# Patient Record
Sex: MALE | Race: WHITE
[De-identification: names, ages, dates, MRNs, and addresses within clinical notes are randomized per-mention and may not be internally consistent; named-entity substitution may affect disease eponyms.]

---

## 2019-10-20 ENCOUNTER — HOSPITAL ENCOUNTER (EMERGENCY)
Dept: HOSPITAL 33 - ED | Age: 79
Discharge: ANOTHER HOSPITAL | End: 2019-10-20
Payer: MEDICARE

## 2019-10-20 VITALS — OXYGEN SATURATION: 92 % | DIASTOLIC BLOOD PRESSURE: 85 MMHG | SYSTOLIC BLOOD PRESSURE: 145 MMHG | HEART RATE: 114 BPM

## 2019-10-20 DIAGNOSIS — J81.0: Primary | ICD-10-CM

## 2019-10-20 DIAGNOSIS — R03.0: ICD-10-CM

## 2019-10-20 DIAGNOSIS — Z79.899: ICD-10-CM

## 2019-10-20 DIAGNOSIS — I25.10: ICD-10-CM

## 2019-10-20 DIAGNOSIS — Z85.46: ICD-10-CM

## 2019-10-20 DIAGNOSIS — K62.5: ICD-10-CM

## 2019-10-20 DIAGNOSIS — E11.9: ICD-10-CM

## 2019-10-20 DIAGNOSIS — E78.00: ICD-10-CM

## 2019-10-20 DIAGNOSIS — N28.9: ICD-10-CM

## 2019-10-20 DIAGNOSIS — K64.5: ICD-10-CM

## 2019-10-20 LAB
ALBUMIN SERPL-MCNC: 3.5 G/DL (ref 3.5–5)
ALP SERPL-CCNC: 88 U/L (ref 38–126)
ALT SERPL-CCNC: 17 U/L (ref 0–50)
ANION GAP SERPL CALC-SCNC: 15.2 MEQ/L (ref 5–15)
APTT PPP: 30.6 SECONDS (ref 24.1–36.1)
AST SERPL QL: 19 U/L (ref 17–59)
BASOPHILS # BLD AUTO: 0.04 10*3/UL (ref 0–0.4)
BASOPHILS NFR BLD AUTO: 0.4 % (ref 0–0.4)
BILIRUB BLD-MCNC: 0.4 MG/DL (ref 0.2–1.3)
BUN SERPL-MCNC: 36 MG/DL (ref 9–20)
CALCIUM SPEC-MCNC: 9.2 MG/DL (ref 8.4–10.2)
CHLORIDE SERPL-SCNC: 109 MMOL/L (ref 98–107)
CO2 SERPL-SCNC: 25 MMOL/L (ref 22–30)
CREAT SERPL-MCNC: 3.16 MG/DL (ref 0.66–1.25)
EOSINOPHIL # BLD AUTO: 0.78 10*3/UL (ref 0–0.5)
GLUCOSE SERPL-MCNC: 102 MG/DL (ref 74–106)
GLUCOSE UR-MCNC: NEGATIVE MG/DL
HCT VFR BLD AUTO: 40.5 % (ref 42–50)
HGB BLD-MCNC: 12.8 GM/DL (ref 12.5–18)
INR PPP: 1.09 (ref 0.8–3)
LIPASE SERPL-CCNC: 87 U/L (ref 23–300)
LYMPHOCYTES # SPEC AUTO: 2.96 10*3/UL (ref 1–4.6)
MCH RBC QN AUTO: 29.2 PG (ref 26–32)
MCHC RBC AUTO-ENTMCNC: 31.6 G/DL (ref 32–36)
MONOCYTES # BLD AUTO: 1.01 10*3/UL (ref 0–1.3)
PLATELET # BLD AUTO: 249 K/MM3 (ref 150–450)
POTASSIUM SERPLBLD-SCNC: 4.3 MMOL/L (ref 3.5–5.1)
PROT SERPL-MCNC: 7 G/DL (ref 6.3–8.2)
PROT UR STRIP-MCNC: NEGATIVE MG/DL
PROTHROMBIN TIME: 12.3 SECONDS (ref 8.83–12.87)
RBC # BLD AUTO: 4.39 M/MM3 (ref 4.1–5.6)
RBC #/AREA URNS HPF: (no result) /HPF (ref 0–2)
SODIUM SERPL-SCNC: 145 MMOL/L (ref 137–145)
TROPONIN T SERPL HS-MCNC: 0.02 NG/ML (ref 0–0.03)
WBC # BLD AUTO: 9.3 K/MM3 (ref 4–10.5)
WBC #/AREA URNS HPF: (no result) /HPF (ref 0–5)

## 2019-10-20 PROCEDURE — 85610 PROTHROMBIN TIME: CPT

## 2019-10-20 PROCEDURE — 96374 THER/PROPH/DIAG INJ IV PUSH: CPT

## 2019-10-20 PROCEDURE — 93005 ELECTROCARDIOGRAM TRACING: CPT

## 2019-10-20 PROCEDURE — 71045 X-RAY EXAM CHEST 1 VIEW: CPT

## 2019-10-20 PROCEDURE — 85025 COMPLETE CBC W/AUTO DIFF WBC: CPT

## 2019-10-20 PROCEDURE — 36000 PLACE NEEDLE IN VEIN: CPT

## 2019-10-20 PROCEDURE — 85730 THROMBOPLASTIN TIME PARTIAL: CPT

## 2019-10-20 PROCEDURE — 80053 COMPREHEN METABOLIC PANEL: CPT

## 2019-10-20 PROCEDURE — 83605 ASSAY OF LACTIC ACID: CPT

## 2019-10-20 PROCEDURE — 84484 ASSAY OF TROPONIN QUANT: CPT

## 2019-10-20 PROCEDURE — 83880 ASSAY OF NATRIURETIC PEPTIDE: CPT

## 2019-10-20 PROCEDURE — 81001 URINALYSIS AUTO W/SCOPE: CPT

## 2019-10-20 PROCEDURE — 99285 EMERGENCY DEPT VISIT HI MDM: CPT

## 2019-10-20 PROCEDURE — 36415 COLL VENOUS BLD VENIPUNCTURE: CPT

## 2019-10-20 PROCEDURE — 96360 HYDRATION IV INFUSION INIT: CPT

## 2019-10-20 PROCEDURE — 83690 ASSAY OF LIPASE: CPT

## 2019-10-20 PROCEDURE — 93041 RHYTHM ECG TRACING: CPT

## 2019-10-20 NOTE — ERPHSYRPT
- History of Present Illness


Time Seen by Provider: 10/20/19 16:30


Source: patient, family


Exam Limitations: no limitations


Physician History: 





Bleeding from his rectum for 3 days intermittently.  Patient had a cardiac 

stent placed on 10/10/2019 at [facility] and placed on daily 

Plavix.  Patient has felt short of breath, which is a chronic issue, but feels 

worse over the past 5 days.


Timing/Duration: day(s) (3)


Severity: mild


Modifying Factors: Worsens With: movement


Associated Symptoms: No nausea, No vomiting, No abdominal pain, No shortness of 

breath, No heartburn, No diaphoresis, No cough, No chills, No chest pain, No 

fever, No headaches, No loss of appetite, No malaise, No rash, No syncope, No 

seizure, No weakness


Allergies/Adverse Reactions: 








No Known Drug Allergies Allergy (Verified 10/20/19 16:40)


 





Home Medications: 








Aspirin EC 81 mg*** [Ecotrin 81 mg***] 81 mg PO DAILY 10/20/19 [History]


Benazepril HCl 5 mg PO DAILY 10/20/19 [History]


Clopidogrel Bisulfate [Clopidogrel] 75 mg PO DAILY 10/20/19 [History]


Glimepiride 2 mg*** [Amaryl 2 MG***] 1 mg PO DAILY 10/20/19 [History]


Metoprolol Tartrate 25 mg PO BID 10/20/19 [History]


Simvastatin 40 mg PO DAILY 10/20/19 [History]








- Review of Systems


Constitutional: No Fever, No Chills, No Fatigue


Eyes: No Eye Pain, No Vision Changes


Ears, Nose, & Throat: No Mouth Pain, No Painful Swallowing


Respiratory: Dyspnea (chronic issue, worse with laying down), No Cough, No 

Dyspnea on Exertion (ROBERTS)


Cardiac: No Chest Pain, No Palpitations, No Syncope


Abdominal/Gastrointestinal: No Abdominal Pain, No Nausea, No Vomiting, No 

Diarrhea, No Hematemesis, No Melena


Genitourinary Symptoms: No Dysuria, No Frequency, No Hematuria, No Flank Pain


Musculoskeletal: No Back Pain, No Neck Pain


Skin: No Pruritis, No Rash


Neurological: No Focal Weakness, No Paralysis, No Parasthesia, No Tremors


Psychological: No Anxiety, No Emotional Lability


Hematologic/Lymphatic: No Easy Bleeding, No Easy Bruising


All Other Systems: Reviewed and Negative





- Past Medical History


ENT History: Cataracts


Cardiac History: Coronary Artery Disease, High Cholesterol


Endocrine Medical History: Diabetes Type II


 History: Renal Disease, Other (Prostate Cancer)





- Nursing Vital Signs


Nursing Vital Signs: 


 Initial Vital Signs











Temperature  98.1 F   10/20/19 16:22


 


Pulse Rate  102 H  10/20/19 16:22


 


Blood Pressure  135/76   10/20/19 16:22


 


O2 Sat by Pulse Oximetry  97   10/20/19 16:22








 Pain Scale











Pain Intensity                 0

















- Physical Exam


General Appearance: no apparent distress


Eye Exam: PERRL/EOMI, eyes nml inspection, No scleral icterus


Ears, Nose, Throat Exam: pharynx normal, moist mucous membranes


Neck Exam: normal inspection, non-tender, supple, full range of motion, No 

meningismus, No Brudzinski, No lymphadenopathy


Respiratory Exam: normal breath sounds, lungs clear, airway intact, diminished 

breath sounds, No respiratory distress, No accessory muscle use, No prolonged 

expirations, No crackles/rales, No rhonchi, No wheezing, No stridor, No pleural 

rub


Cardiovascular Exam: regular rate/rhythm, normal heart sounds, normal 

peripheral pulses, capillary refill <2 sec


Gastrointestinal/Abdomen Exam: soft, normal bowel sounds, No tenderness, No 

distention, No mass, No guarding, No ecchymosis, No rebound


Male Genitalia Exam: normal genitalia


Rectal Exam: normal rectal tone, hemorrhoids (positive small clots from one 

opened external hemorrhoid), blood (small amount with no active bleeding), 

other (examination chaperoned by Juan Carlos Garcia RN), No black stool


Back Exam: No CVA tenderness, No vertebral tenderness


Extremity Exam: normal inspection, normal range of motion, pelvis stable, No 

calf tenderness, No lacerations, No chloe's sign, No pedal edema, No swelling


Neurologic Exam: alert, oriented x 3, cooperative, CNs II-XII nml as tested, 

normal mood/affect


Skin Exam: normal color, warm, dry, No rash, No jaundice, No cyanosis


**SpO2 Interpretation**: normal


O2 Delivery: Room Air





- Course


Nursing assessment & vital signs reviewed: Yes


EKG Interpreted by Me: RATE (102), Sinus Tach, NORMAL AXIS, NORMAL INTERVALS, 

NORMAL QRS, NORMAL ST-T, Other (negative previous EKG for comparison)





- Radiology Exams


  ** Chest


X-ray Interpretation: Interpreted by me, Reviewed by me, No Fracture, No 

Pneumonia, No Pneumothorax, No Infiltrates, Other (pulmonary vascular congestion

)


Ordered Tests: 


 Active Orders 24 hr











 Category Date Time Status


 


 Cardiac Monitor STAT Care  10/20/19 16:36 Active


 


 EKG-ER Only STAT Care  10/20/19 16:35 Active


 


 IV Insertion STAT Care  10/20/19 16:35 Active


 


 Orthostatic Vital Signs STAT Care  10/20/19 16:37 Active


 


 CHEST 1 VIEW (PORTABLE) Stat Exams  10/20/19 16:35 Taken


 


 CBC W DIFF Stat Lab  10/20/19 16:45 Completed


 


 CMP Stat Lab  10/20/19 16:45 Completed


 


 LIPASE Stat Lab  10/20/19 16:45 Completed


 


 Lactic Acid Stat Lab  10/20/19 16:37 Completed


 


 NT PRO BNP Stat Lab  10/20/19 16:45 Completed


 


 PROTIME WITH INR Stat Lab  10/20/19 16:45 Completed


 


 PTT Stat Lab  10/20/19 16:45 Completed


 


 TROPONIN Stat Lab  10/20/19 16:45 Completed


 


 UA W/RFX UR CULTURE Stat Lab  10/20/19 17:42 Completed








Medication Summary














Discontinued Medications














Generic Name Dose Route Start Last Admin





  Trade Name Freq  PRN Reason Stop Dose Admin


 


Sodium Chloride  1,000 mls @ 999 mls/hr  10/20/19 16:35  10/20/19 18:20





  Sodium Chloride 0.9% 1000 Ml  IV  10/20/19 17:35  Infused





  .Q1H1M STA   Infusion





     





     





     





     


 


Sodium Chloride  Confirm  10/20/19 16:49  





  Sodium Chloride 0.9% 1000 Ml  Administered  10/20/19 16:50  





  Dose   





  1,000 mls @ ud   





  .ROUTE   





  .K-MED ONE   





     





     





     





     











Lab/Rad Data: 


 Laboratory Result Diagrams





 10/20/19 16:45 





 10/20/19 16:45 





 Laboratory Results











  10/20/19 10/20/19 10/20/19 Range/Units





  17:42 16:45 16:45 


 


WBC     (4.0-10.5)  K/mm3


 


RBC     (4.1-5.6)  M/mm3


 


Hgb     (12.5-18.0)  gm/dl


 


Hct     (42-50)  %


 


MCV     ()  fl


 


MCH     (26-32)  pg


 


MCHC     (32-36)  g/dl


 


RDW     (11.5-14.0)  %


 


Plt Count     (150-450)  K/mm3


 


MPV     (6-9.5)  fl


 


Gran %     (36.0-66.0)  %


 


Eos # (Auto)     (0-0.5)  


 


Absolute Lymphs (auto)     (1.0-4.6)  


 


Absolute Monos (auto)     (0.0-1.3)  


 


Lymphocytes %     (24.0-44.0)  %


 


Monocytes %     (0.0-12.0)  %


 


Eosinophils %     (0.00-5.0)  %


 


Basophils %     (0.0-0.4)  %


 


Absolute Granulocytes     (1.4-6.9)  


 


Basophils #     (0-0.4)  


 


PT    12.3  (8.83-12.87)  SECONDS


 


INR    1.09  (0.8-3.0)  


 


APTT    30.6  (24.1-36.1)  SECONDS


 


Sodium     (137-145)  mmol/L


 


Potassium     (3.5-5.1)  mmol/L


 


Chloride     ()  mmol/L


 


Carbon Dioxide     (22-30)  mmol/L


 


Anion Gap     (5-15)  MEQ/L


 


BUN     (9-20)  mg/dL


 


Creatinine     (0.66-1.25)  mg/dL


 


Estimated GFR     ML/MIN


 


Glucose     ()  mg/dL


 


Lactic Acid     (0.4-2.0)  


 


Calcium     (8.4-10.2)  mg/dL


 


Total Bilirubin     (0.2-1.3)  mg/dL


 


AST     (17-59)  U/L


 


ALT     (0-50)  U/L


 


Alkaline Phosphatase     ()  U/L


 


Troponin I   0.016   (0.000-0.034)  ng/mL


 


NT-Pro-B Natriuret Pep   9900 H   (0-1800)  pg/mL


 


Serum Total Protein     (6.3-8.2)  g/dL


 


Albumin     (3.5-5.0)  g/dL


 


Lipase     ()  U/L


 


Urine Color  YELLOW    (YELLOW)  


 


Urine Appearance  CLEAR    (CLEAR)  


 


Urine pH  5.0    (5-6)  


 


Ur Specific Gravity  1.019    (1.005-1.025)  


 


Urine Protein  NEGATIVE    (Negative)  


 


Urine Ketones  NEGATIVE    (NEGATIVE)  


 


Urine Blood  NEGATIVE    (0-5)  Pito/ul


 


Urine Nitrite  NEGATIVE    (NEGATIVE)  


 


Urine Bilirubin  NEGATIVE    (NEGATIVE)  


 


Urine Urobilinogen  NEGATIVE    (0-1)  mg/dL


 


Ur Leukocyte Esterase  TRACE    (NEGATIVE)  


 


Urine WBC (Auto)  0-2    (0-5)  /HPF


 


Urine RBC (Auto)  NONE    (0-2)  /HPF


 


U Epithel Cells (Auto)  NONE    (FEW)  /HPF


 


Urine Bacteria (Auto)  NONE SEEN    (NEGATIVE)  /HPF


 


Urine Mucus (Auto)  SLIGHT    (NEGATIVE)  /HPF


 


Urine Culture Reflexed  NO    (NO)  


 


Urine Glucose  NEGATIVE    (NEGATIVE)  mg/dL














  10/20/19 10/20/19 10/20/19 Range/Units





  16:45 16:45 16:37 


 


WBC   9.3   (4.0-10.5)  K/mm3


 


RBC   4.39   (4.1-5.6)  M/mm3


 


Hgb   12.8   (12.5-18.0)  gm/dl


 


Hct   40.5 L   (42-50)  %


 


MCV   92.3   ()  fl


 


MCH   29.2   (26-32)  pg


 


MCHC   31.6 L   (32-36)  g/dl


 


RDW   16.7 H   (11.5-14.0)  %


 


Plt Count   249   (150-450)  K/mm3


 


MPV   10.9 H   (6-9.5)  fl


 


Gran %   48.4   (36.0-66.0)  %


 


Eos # (Auto)   0.78 H   (0-0.5)  


 


Absolute Lymphs (auto)   2.96   (1.0-4.6)  


 


Absolute Monos (auto)   1.01   (0.0-1.3)  


 


Lymphocytes %   31.9   (24.0-44.0)  %


 


Monocytes %   10.9   (0.0-12.0)  %


 


Eosinophils %   8.4 H   (0.00-5.0)  %


 


Basophils %   0.4   (0.0-0.4)  %


 


Absolute Granulocytes   4.48   (1.4-6.9)  


 


Basophils #   0.04   (0-0.4)  


 


PT     (8.83-12.87)  SECONDS


 


INR     (0.8-3.0)  


 


APTT     (24.1-36.1)  SECONDS


 


Sodium  145    (137-145)  mmol/L


 


Potassium  4.3    (3.5-5.1)  mmol/L


 


Chloride  109 H    ()  mmol/L


 


Carbon Dioxide  25    (22-30)  mmol/L


 


Anion Gap  15.2 H    (5-15)  MEQ/L


 


BUN  36 H    (9-20)  mg/dL


 


Creatinine  3.16 H    (0.66-1.25)  mg/dL


 


Estimated GFR  20.4    ML/MIN


 


Glucose  102    ()  mg/dL


 


Lactic Acid    1.7  (0.4-2.0)  


 


Calcium  9.2    (8.4-10.2)  mg/dL


 


Total Bilirubin  0.40    (0.2-1.3)  mg/dL


 


AST  19    (17-59)  U/L


 


ALT  17    (0-50)  U/L


 


Alkaline Phosphatase  88    ()  U/L


 


Troponin I     (0.000-0.034)  ng/mL


 


NT-Pro-B Natriuret Pep     (0-1800)  pg/mL


 


Serum Total Protein  7.0    (6.3-8.2)  g/dL


 


Albumin  3.5    (3.5-5.0)  g/dL


 


Lipase  87    ()  U/L


 


Urine Color     (YELLOW)  


 


Urine Appearance     (CLEAR)  


 


Urine pH     (5-6)  


 


Ur Specific Gravity     (1.005-1.025)  


 


Urine Protein     (Negative)  


 


Urine Ketones     (NEGATIVE)  


 


Urine Blood     (0-5)  Pito/ul


 


Urine Nitrite     (NEGATIVE)  


 


Urine Bilirubin     (NEGATIVE)  


 


Urine Urobilinogen     (0-1)  mg/dL


 


Ur Leukocyte Esterase     (NEGATIVE)  


 


Urine WBC (Auto)     (0-5)  /HPF


 


Urine RBC (Auto)     (0-2)  /HPF


 


U Epithel Cells (Auto)     (FEW)  /HPF


 


Urine Bacteria (Auto)     (NEGATIVE)  /HPF


 


Urine Mucus (Auto)     (NEGATIVE)  /HPF


 


Urine Culture Reflexed     (NO)  


 


Urine Glucose     (NEGATIVE)  mg/dL














- Progress


Progress: unchanged


Progress Note: 





10/20/19 18:35


Spoke with Dr Brandon, ED attending at [facility] in [place], [place].  Dr Brandon accepted the patient for transfer to [facility] emergency department.





10/20/19 19:06


Patient is doing well with oxygen via nasal cannula





Counseled pt/family regarding: lab results, diagnosis, need for follow-up, rad 

results





- Departure


Departure Disposition: Transfer ([facility] in [place], [place])


Clinical Impression: 


 Acute pulmonary edema, Acute renal insufficiency, Bleeding external hemorrhoids

, Elevated blood pressure reading without diagnosis of hypertension





Condition: Fair


Critical Care Time: Yes


Referrals: 


PAOLA KEVIN [Primary Care Provider] - 


Plan of Treatment: 


Patient transferred to the ED at [facility] accepted by Dr Brandon, ED attending

## 2019-10-20 NOTE — XRAY
Indication: Dyspnea.



Comparison: December 11, 2010.



Portable chest again demonstrates chronic lung markings with new bibasilar

interstitial alveolar opacities and small effusions.  Heart is not enlarged.

Bony thorax intact again with mild osteopenia, degenerative changes, and old

nonunited left clavicle fracture.